# Patient Record
Sex: FEMALE | Race: BLACK OR AFRICAN AMERICAN
[De-identification: names, ages, dates, MRNs, and addresses within clinical notes are randomized per-mention and may not be internally consistent; named-entity substitution may affect disease eponyms.]

---

## 2020-07-10 NOTE — RAD
LEFT HIP 2 VIEWS:

 

Date:  07/10/2020

 

HISTORY:  

Left hip pain. 

 

FINDINGS/IMPRESSION: 

Degenerative changes are present. No fracture, dislocation, or bony destruction identified. 

 

 

POS: [facility]

## 2020-07-10 NOTE — CT
EXAM:  CT Lower Ext Lt W Con



DATE:  7/10/2020 8:50 AM



INDICATION:  Left hip pain



COMPARISON:  Left hip radiograph dated July 10, 2020



FINDING:  No acute fracture is evident. No large joint effusion is grossly evident. No enlarged lymph
 nodes are evident. There are bilateral tubal ligation clips. There is mild left hip osteoarthrosis

There is moderate left SI joint osteoarthrosis.





IMPRESSION:No acute fracture. No large joint effusion is grossly evident. Moderate left SI joint oste
oarthrosis. Mild left hip osteoarthrosis.

 



Reported By: Michael Traylor 

Electronically Signed:  7/10/2020 9:54 AM

## 2022-05-13 ENCOUNTER — HOSPITAL ENCOUNTER (EMERGENCY)
Dept: HOSPITAL 92 - ERS | Age: 52
Discharge: HOME | End: 2022-05-13
Payer: COMMERCIAL

## 2022-05-13 DIAGNOSIS — S80.12XA: ICD-10-CM
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
## 2022-05-13 DIAGNOSIS — I10: ICD-10-CM

## 2022-05-13 DIAGNOSIS — S46.912A: Primary | ICD-10-CM

## 2022-05-13 DIAGNOSIS — M10.9: ICD-10-CM

## 2022-05-13 DIAGNOSIS — E11.9: ICD-10-CM

## 2022-05-13 DIAGNOSIS — S40.012A: ICD-10-CM

## 2022-05-13 PROCEDURE — 99283 EMERGENCY DEPT VISIT LOW MDM: CPT
